# Patient Record
Sex: FEMALE | Race: WHITE | URBAN - METROPOLITAN AREA
[De-identification: names, ages, dates, MRNs, and addresses within clinical notes are randomized per-mention and may not be internally consistent; named-entity substitution may affect disease eponyms.]

---

## 2024-07-03 ENCOUNTER — APPOINTMENT (OUTPATIENT)
Dept: DERMATOLOGY | Facility: CLINIC | Age: 34
End: 2024-07-03
Payer: COMMERCIAL

## 2024-07-03 VITALS — DIASTOLIC BLOOD PRESSURE: 80 MMHG | HEART RATE: 75 BPM | SYSTOLIC BLOOD PRESSURE: 119 MMHG

## 2024-07-03 DIAGNOSIS — C43.9 MELANOMA OF SKIN (MULTI): ICD-10-CM

## 2024-07-03 PROCEDURE — 12032 INTMD RPR S/A/T/EXT 2.6-7.5: CPT | Performed by: DERMATOLOGY

## 2024-07-03 PROCEDURE — 11602 EXC TR-EXT MAL+MARG 1.1-2 CM: CPT | Performed by: DERMATOLOGY

## 2024-07-03 RX ORDER — MUPIROCIN 20 MG/G
OINTMENT TOPICAL DAILY
Qty: 44 G | Refills: 2 | Status: SHIPPED | OUTPATIENT
Start: 2024-07-03 | End: 2024-08-02

## 2024-07-03 RX ORDER — VALACYCLOVIR HYDROCHLORIDE 500 MG/1
TABLET, FILM COATED ORAL
COMMUNITY
Start: 2024-06-26

## 2024-07-03 RX ORDER — CHLORHEXIDINE GLUCONATE 40 MG/ML
SOLUTION TOPICAL DAILY PRN
Qty: 236 ML | Refills: 2 | Status: SHIPPED | OUTPATIENT
Start: 2024-07-03 | End: 2024-08-02

## 2024-07-03 RX ORDER — DROSPIRENONE AND ETHINYL ESTRADIOL 0.03MG-3MG
1 KIT ORAL
COMMUNITY

## 2024-07-03 NOTE — PROGRESS NOTES
Excision Operative Note    Date of Surgery:  7/3/2024  Surgeon:  Blaine Ahmadi MD PhD  Office Location: [address]  [facility] [address]  Dept: [phone]  Dept Fax: [phone]  Referring Provider: Laverne Bosch MD  [id number] [address] Bosch MD  [address]  [place] [address]    Aston Henderson is a 34 y.o. female who presents for the following: Excision (Right Distal Lateral Leg, MIS) for melanoma.    According to the patient, the lesion has been present for approximately greater than 1 year at the time of diagnosis.  The lesion is not causing symptoms.  The lesion has not been treated previously.    The patient does not have a pacemaker / defibrillator.  The patient does not have a heart valve / joint replacement.    The patient is not on blood thinners.   The patient does not have a history of hepatitis B or C.  The patient does not have a history of HIV.  The patient does not have a history of immunosuppression (e.g. organ transplantation, malignancy, medications)    Is it okay to leave a phone message with results? Yes  Who else may we leave results with: (name, relationship)     The following portions of the chart were reviewed this encounter and updated as appropriate:         Assessment/Plan   Pre-procedure:   Obtained informed consent: written from patient  The surgical site was identified and confirmed with the patient.     Intra-operative:   Audible time out called at : 2:51 PM 07/03/24  by: JAQUELIN MCKEON RN   Verified patient name, birthdate, site, specimen bottle label & requisition.    The planned procedure(s) was again reviewed with the patient. The risks of bleeding, infection, nerve damage and scarring were reviewed. The patient identity, surgical site, and planned procedure(s) were verified.     Biopsy Accession Number: [id number]  Melanoma of skin (Multi)  Right Distal Lateral Leg    Skin excision    Lesion  length (cm):  1  Lesion width (cm):  1  Margin per side (cm):  0.5  Total excision diameter (cm):  2  Breslow depth (mm): MIS.  Informed consent: discussed and consent obtained    Timeout: patient name, date of birth, surgical site, and procedure verified    Procedure prep:  Patient was prepped and draped  Anesthesia: the lesion was anesthetized in a standard fashion    Anesthetic:  1% lidocaine w/ epinephrine 1-100,000 local infiltration  Instrument used: #15 blade    Hemostasis achieved with: electrodesiccation    Outcome: patient tolerated procedure well with no complications    Post-procedure details: sterile dressing applied and wound care instructions given    Dressing type: pressure dressing, Hypafix, petrolatum, Telfa pad, Kerlix and Coban    Additional details:  Melanoma Melissa:   Curative Intent: Yes  Original Breslow Thickness:   Clinical margin width: 0.5 cm  Depth of excision: Full thickness     Skin repair  Complexity:  Intermediate  Final length (cm):  4.5  Informed consent: discussed and consent obtained    Timeout: patient name, date of birth, surgical site, and procedure verified    Procedure prep:  Patient prepped in sterile fashion  Anesthesia: the lesion was anesthetized in a standard fashion    Anesthetic:  1% lidocaine w/ epinephrine 1-100,000 local infiltration  Reason for type of repair: reduce tension to allow closure    Undermining: edges undermined    Subcutaneous layers (deep stitches):   Suture size:  3-0  Suture type: Vicryl (polyglactin 910)    Stitches:  Buried vertical mattress  Fine/surface layer approximation (top stitches):   Suture size:  4-0  Suture type: Prolene (polypropylene)    Stitches: simple running    Hemostasis achieved with: electrodesiccation  Outcome: patient tolerated procedure well with no complications    Post-procedure details: sterile dressing applied and wound care instructions given    Dressing type: pressure dressing, bandage and petrolatum      Specimen 1 -  Dermatopathology- DERM LAB  Differential Diagnosis: Melanoma  Check Margins Yes   Comments:  [id number]  Dermpath Lab: Routine Histopathology (formalin-fixed tissue)    Intermediate Linear Repair:  Given the location and size of the defect, it was determined that an intermediate layered linear closure was required to restore normal anatomy and function. The repair is an intermediate closure as two layers of sutures were required. The defect was undermined extensively at the level of the subcutaneous plane. Standing cutaneous cones were removed using Burow's triangles. The wound edges were brought into close approximation with buried vertical mattress sutures. The remainder of the wound was then closed with epidermal top sutures.    The final repair measured 4.5 cm    Wound care was discussed, and the patient was given written post-operative wound care instructions.      The patient will follow up with Blaine Ahmadi MD PhD as needed for any post operative problems or concerns, and will follow up with their primary dermatologist as scheduled.

## 2024-07-08 LAB
LABORATORY COMMENT REPORT: NORMAL
PATH REPORT.FINAL DX SPEC: NORMAL
PATH REPORT.GROSS SPEC: NORMAL
PATH REPORT.MICROSCOPIC SPEC OTHER STN: NORMAL
PATH REPORT.RELEVANT HX SPEC: NORMAL
PATH REPORT.TOTAL CANCER: NORMAL

## 2024-07-15 ENCOUNTER — APPOINTMENT (OUTPATIENT)
Dept: DERMATOLOGY | Facility: CLINIC | Age: 34
End: 2024-07-15
Payer: COMMERCIAL

## 2024-07-15 DIAGNOSIS — Z48.02 ENCOUNTER FOR REMOVAL OF SUTURES: ICD-10-CM

## 2024-07-15 PROCEDURE — 99024 POSTOP FOLLOW-UP VISIT: CPT | Performed by: DERMATOLOGY

## 2024-07-15 NOTE — PROGRESS NOTES
Office Follow Up Note    Visit Summary  Chief Complaint    1. Suture Removal    Evelyn Henderson is a 34 y.o. female who presents for 12 day follow up after surgery for a melanoma. The patient has no concerns today.     Location Operation site location: Right Distal Lateral Leg     On exam,  Ms. Henderson is well-appearing and in no apparent distress. The surgical site appears clean with minimal to no erythema. No tenderness and good wound edge apposition.    Assessment and Plan:    The patient was reassured that the wound is healing appropriately.   Sutures were removed without complication today.  The dressing was removed, the wound cleaned a a new dressing reapplied.     The patient was advised on the importance of routine skin monitoring including follow up with general dermatology and instructed to call with any further concerns. The patient will return as needed.

## 2024-07-16 NOTE — RESULT ENCOUNTER NOTE
Patient contacted and verbally confirmed understanding of the following:    The margins are free of atypia. No further treatment is needed.    Clinical follow up is recommended for any changes or new lesions of concern.

## 2024-07-16 NOTE — RESULT ENCOUNTER NOTE
Attempted to call patient regarding results. No answer and no identifying information in the voicemail therefore left message asking for a call back to discuss results.

## 2024-09-29 DIAGNOSIS — C43.9 MELANOMA OF SKIN (MULTI): ICD-10-CM

## 2024-09-29 NOTE — TUMOR BOARD NOTE
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 S/p WLE with 5 mm margins. Adequately surgically treated.    Recommendation:  Annual H&P.    Review Multidisciplinary Cutaneous Oncology Conference recommendation with patient.  Continue routine follow up and total body skin exams with Laverne Bosch.    Follow Up:  Laverne Bosch      History and Physical Exam  Dermatologic History:   34 y.o. female with a biopsy of the right distal lateral leg on 7/3/2024 showing a melanoma in situ. She underwent a WLE with 5 mm margins on 7/3/2024 which showed changes consistent with previous procedure, inked margins free in the planes of sections examined without residual atypical melanocytic neoplasm seen.    Past Medical History:    Past Medical History:   Diagnosis Date    Other specified health status     No pertinent past medical history           Pathology  Dermatopathology- DERM LAB: [id number]  Order: [id number]   Collected 7/3/2024 13:09       Status: Final result       Visible to patient: Yes (not seen)       Dx: Melanoma of skin (Multi)    3 Result Notes      Component    FINAL DIAGNOSIS   SKIN, RIGHT DISTAL LATERAL LEG, EXCISION:  CHANGES CONSISTENT WITH PREVIOUS PROCEDURE, INKED MARGINS FREE IN THE PLANES OF SECTIONS EXAMINED, WITHOUT RESIDUAL ATYPICAL MELANOCYTIC NEOPLASM SEEN.     ** Electronically signed out by Sheng Hernandez MD **          SURGICAL PATHOLOGY  Order: [id number]  Component 4 mo ago   Case Report Surgical Pathology Report                         Case: [id number]                                  Authorizing Provider:  Laverne Bosch MD  Collected:           05/28/2024 11:00 AM          Ordering Location:      [facility]      Received:            05/28/2024 09:37 PM                                 [facility]                                                  Pathologist:           Steffi Morgan MD                                                          Specimen:    Skin, Shave Biopsy, R distal lateral leg                                               FINAL DIAGNOSIS    A.  Skin, right distal lateral leg, shave biopsy:  - Melanoma in situ, see comment.         Radiology

## 2024-09-30 ENCOUNTER — TUMOR BOARD CONFERENCE (OUTPATIENT)
Dept: HEMATOLOGY/ONCOLOGY | Facility: HOSPITAL | Age: 34
End: 2024-09-30
Payer: COMMERCIAL